# Patient Record
Sex: MALE | Race: WHITE | NOT HISPANIC OR LATINO | URBAN - METROPOLITAN AREA
[De-identification: names, ages, dates, MRNs, and addresses within clinical notes are randomized per-mention and may not be internally consistent; named-entity substitution may affect disease eponyms.]

---

## 2020-11-13 ENCOUNTER — WEB ENCOUNTER (OUTPATIENT)
Dept: URBAN - METROPOLITAN AREA CLINIC 70 | Facility: CLINIC | Age: 80
End: 2020-11-13

## 2020-11-13 ENCOUNTER — OFFICE VISIT (OUTPATIENT)
Dept: URBAN - METROPOLITAN AREA CLINIC 70 | Facility: CLINIC | Age: 80
End: 2020-11-13
Payer: COMMERCIAL

## 2020-11-13 DIAGNOSIS — R10.32 ABDOMINAL PAIN, LLQ: ICD-10-CM

## 2020-11-13 DIAGNOSIS — R53.82 CHRONIC FATIGUE: ICD-10-CM

## 2020-11-13 DIAGNOSIS — R68.81 EARLY SATIETY: ICD-10-CM

## 2020-11-13 DIAGNOSIS — K59.09 CHRONIC CONSTIPATION: ICD-10-CM

## 2020-11-13 PROCEDURE — 4004F PT TOBACCO SCREEN RCVD TLK: CPT | Performed by: INTERNAL MEDICINE

## 2020-11-13 PROCEDURE — G9902 PT SCRN TBCO AND ID AS USER: HCPCS | Performed by: INTERNAL MEDICINE

## 2020-11-13 PROCEDURE — G9906 PT RECV TBCO CESS INTERV: HCPCS | Performed by: INTERNAL MEDICINE

## 2020-11-13 PROCEDURE — G8420 CALC BMI NORM PARAMETERS: HCPCS | Performed by: INTERNAL MEDICINE

## 2020-11-13 PROCEDURE — G8427 DOCREV CUR MEDS BY ELIG CLIN: HCPCS | Performed by: INTERNAL MEDICINE

## 2020-11-13 PROCEDURE — G8483 FLU IMM NO ADMIN DOC REA: HCPCS | Performed by: INTERNAL MEDICINE

## 2020-11-13 PROCEDURE — 99214 OFFICE O/P EST MOD 30 MIN: CPT | Performed by: INTERNAL MEDICINE

## 2020-11-13 RX ORDER — OMEPRAZOLE 20 MG/1
TAKE 1 CAPSULE BY ORAL ROUTE 2 TIMES A DAY FOR 90 DAYS CAPSULE, DELAYED RELEASE ORAL 2
Qty: 180 | Refills: 1 | Status: ACTIVE | COMMUNITY
Start: 2017-09-13

## 2020-11-13 RX ORDER — GABAPENTIN 600 MG/1
TABLET, FILM COATED ORAL
Qty: 0 | Refills: 0 | Status: ACTIVE | COMMUNITY
Start: 1900-01-01

## 2020-11-13 RX ORDER — VERAPAMIL HYDROCHLORIDE 80 MG/1
1 PO QD TABLET ORAL
Qty: 0 | Refills: 0 | Status: ACTIVE | COMMUNITY
Start: 1900-01-01

## 2020-11-13 RX ORDER — ZOLPIDEM TARTRATE 10 MG/1
TABLET, FILM COATED ORAL
Qty: 0 | Refills: 0 | Status: ACTIVE | COMMUNITY
Start: 2017-08-21

## 2020-11-13 RX ORDER — AMITRIPTYLINE HYDROCHLORIDE 10 MG/1
1 TABLET AT BEDTIME TABLET, FILM COATED ORAL ONCE A DAY
Status: ACTIVE | COMMUNITY

## 2020-11-13 NOTE — HPI-TODAY'S VISIT:
Pt presents with abdominal pain. Pain has been present for left side abdominal pain.  Pain occurs for hours at a time. Pain is described as "soreness". Pain is aggrevated by nothing in particular. Pain is alleviated by nothing in particular. Pain occurs daily. Associated symptoms are constipation. Miralax has helped the constipation in the past but he has not been taking it recently. Prior workup CT scan 12/12/19 with no acute findings. He complains mostly of generalized weakness and fatigue. Feels hungry every couple of hours but becomes full after just a few bites. Reports a negative cardiac workup. Recent CBC and CMP done by his PCP were normal. He had a normal EGD in 2017. Last colonoscopy was >10 years ago.

## 2020-11-18 LAB
A/G RATIO: 1.8
ALBUMIN: 4.2
ALKALINE PHOSPHATASE: 122
ALT (SGPT): 10
AST (SGOT): 15
BILIRUBIN, TOTAL: 0.6
BUN/CREATININE RATIO: 14
BUN: 16
CALCIUM: 8.6
CARBON DIOXIDE, TOTAL: 24
CHLORIDE: 102
CREATININE: 1.18
EGFR IF AFRICN AM: 67
EGFR IF NONAFRICN AM: 58
FERRITIN, SERUM: 46
FOLATE (FOLIC ACID), SERUM: 4.5
GLOBULIN, TOTAL: 2.4
GLUCOSE: 65
HEMATOCRIT: 47.8
HEMOGLOBIN: 15.7
IRON BIND.CAP.(TIBC): 327
IRON SATURATION: 26
IRON: 86
MCH: 29.5
MCHC: 32.8
MCV: 90
NRBC: (no result)
PLATELETS: 152
POTASSIUM: 3.8
PROTEIN, TOTAL: 6.6
RBC: 5.32
RDW: 14.2
SODIUM: 141
UIBC: 241
VITAMIN B12: 250
WBC: 6.5

## 2020-12-08 ENCOUNTER — OFFICE VISIT (OUTPATIENT)
Dept: URBAN - METROPOLITAN AREA CLINIC 70 | Facility: CLINIC | Age: 80
End: 2020-12-08
Payer: COMMERCIAL

## 2020-12-08 ENCOUNTER — TELEPHONE ENCOUNTER (OUTPATIENT)
Dept: URBAN - METROPOLITAN AREA CLINIC 70 | Facility: CLINIC | Age: 80
End: 2020-12-08

## 2020-12-08 DIAGNOSIS — R53.82 CHRONIC FATIGUE: ICD-10-CM

## 2020-12-08 DIAGNOSIS — K59.09 CHRONIC CONSTIPATION: ICD-10-CM

## 2020-12-08 PROBLEM — 52702003 CHRONIC FATIGUE SYNDROME: Status: ACTIVE | Noted: 2020-11-13

## 2020-12-08 PROCEDURE — G8420 CALC BMI NORM PARAMETERS: HCPCS | Performed by: INTERNAL MEDICINE

## 2020-12-08 PROCEDURE — 99213 OFFICE O/P EST LOW 20 MIN: CPT | Performed by: INTERNAL MEDICINE

## 2020-12-08 PROCEDURE — G8427 DOCREV CUR MEDS BY ELIG CLIN: HCPCS | Performed by: INTERNAL MEDICINE

## 2020-12-08 PROCEDURE — 4004F PT TOBACCO SCREEN RCVD TLK: CPT | Performed by: INTERNAL MEDICINE

## 2020-12-08 PROCEDURE — G8483 FLU IMM NO ADMIN DOC REA: HCPCS | Performed by: INTERNAL MEDICINE

## 2020-12-08 RX ORDER — VERAPAMIL HYDROCHLORIDE 80 MG/1
1 PO QD TABLET ORAL
Qty: 0 | Refills: 0 | Status: ACTIVE | COMMUNITY
Start: 1900-01-01

## 2020-12-08 RX ORDER — AMITRIPTYLINE HYDROCHLORIDE 10 MG/1
1 TABLET AT BEDTIME TABLET, FILM COATED ORAL ONCE A DAY
Status: ACTIVE | COMMUNITY

## 2020-12-08 RX ORDER — OMEPRAZOLE 20 MG/1
TAKE 1 CAPSULE BY ORAL ROUTE 2 TIMES A DAY FOR 90 DAYS CAPSULE, DELAYED RELEASE ORAL 2
Qty: 180 | Refills: 1 | Status: ACTIVE | COMMUNITY
Start: 2017-09-13

## 2020-12-08 RX ORDER — GABAPENTIN 600 MG/1
TABLET, FILM COATED ORAL
Qty: 0 | Refills: 0 | Status: ACTIVE | COMMUNITY
Start: 1900-01-01

## 2020-12-08 RX ORDER — ZOLPIDEM TARTRATE 10 MG/1
TABLET, FILM COATED ORAL
Qty: 0 | Refills: 0 | Status: ACTIVE | COMMUNITY
Start: 2017-08-21

## 2020-12-08 NOTE — HPI-TODAY'S VISIT:
Note from OV on 11/13/20: Pt presents with left side abdominal pain.  Pain occurs for hours at a time. Pain is described as "soreness". Pain is aggrevated by nothing in particular. Pain is alleviated by nothing in particular. Pain occurs daily. Associated symptoms are constipation. Miralax has helped the constipation in the past but he has not been taking it recently. Prior workup CT scan 12/12/19 with no acute findings. He complains mostly of generalized weakness and fatigue. Feels hungry every couple of hours but becomes full after just a few bites. Reports a negative cardiac workup. Recent CBC and CMP done by his PCP were normal. He had a normal EGD in 2017. Last colonoscopy was >10 years ago. TODAY: He continues to c/o fatigue and constant hunger. Fells like "something is using up my calories". Weight is stable. Miralax caused severe diarrhea. He went to the ED on 11/28/20 for abdominal pain. CT scan showed mild to moderate stool but no other acute findings. CBC and CMP were normal.

## 2021-02-05 ENCOUNTER — OFFICE VISIT (OUTPATIENT)
Dept: URBAN - METROPOLITAN AREA CLINIC 70 | Facility: CLINIC | Age: 81
End: 2021-02-05
Payer: COMMERCIAL

## 2021-02-05 ENCOUNTER — DASHBOARD ENCOUNTERS (OUTPATIENT)
Age: 81
End: 2021-02-05

## 2021-02-05 VITALS
SYSTOLIC BLOOD PRESSURE: 137 MMHG | WEIGHT: 197.2 LBS | HEART RATE: 84 BPM | BODY MASS INDEX: 26.14 KG/M2 | HEIGHT: 73 IN | TEMPERATURE: 97 F | DIASTOLIC BLOOD PRESSURE: 80 MMHG

## 2021-02-05 DIAGNOSIS — R53.1 WEAKNESS AND FATIGUE: ICD-10-CM

## 2021-02-05 PROCEDURE — 99213 OFFICE O/P EST LOW 20 MIN: CPT | Performed by: INTERNAL MEDICINE

## 2021-02-05 PROCEDURE — 4004F PT TOBACCO SCREEN RCVD TLK: CPT | Performed by: INTERNAL MEDICINE

## 2021-02-05 PROCEDURE — G8482 FLU IMMUNIZE ORDER/ADMIN: HCPCS | Performed by: INTERNAL MEDICINE

## 2021-02-05 PROCEDURE — G8427 DOCREV CUR MEDS BY ELIG CLIN: HCPCS | Performed by: INTERNAL MEDICINE

## 2021-02-05 PROCEDURE — G8420 CALC BMI NORM PARAMETERS: HCPCS | Performed by: INTERNAL MEDICINE

## 2021-02-05 RX ORDER — OMEPRAZOLE 20 MG/1
TAKE 1 CAPSULE BY ORAL ROUTE 2 TIMES A DAY FOR 90 DAYS CAPSULE, DELAYED RELEASE ORAL 2
Qty: 180 | Refills: 1 | Status: ACTIVE | COMMUNITY
Start: 2017-09-13

## 2021-02-05 RX ORDER — VERAPAMIL HYDROCHLORIDE 80 MG/1
1 PO QD TABLET ORAL
Qty: 0 | Refills: 0 | Status: ACTIVE | COMMUNITY
Start: 1900-01-01

## 2021-02-05 RX ORDER — GABAPENTIN 600 MG/1
TABLET, FILM COATED ORAL
Qty: 0 | Refills: 0 | Status: ACTIVE | COMMUNITY
Start: 1900-01-01

## 2021-02-05 RX ORDER — AMITRIPTYLINE HYDROCHLORIDE 10 MG/1
1 TABLET AT BEDTIME TABLET, FILM COATED ORAL ONCE A DAY
Status: ACTIVE | COMMUNITY

## 2021-02-05 RX ORDER — ZOLPIDEM TARTRATE 10 MG/1
TABLET, FILM COATED ORAL
Qty: 0 | Refills: 0 | Status: ACTIVE | COMMUNITY
Start: 2017-08-21

## 2021-02-05 RX ORDER — ASPIRIN 81 MG/1
1 TABLET TABLET, COATED ORAL ONCE A DAY
Refills: 0 | Status: ACTIVE | COMMUNITY
Start: 1900-01-01

## 2021-02-05 NOTE — HPI-TODAY'S VISIT:
no abd pain  weight stable  co need t eat frequently or he gets weak   has had this for years   neg ct recently neg egd in recent past    had cardiac issue with egd and ercp in past and had held off on colonoscopy due to this hx    no lgi sxs   has copd and cont to smoke  sees dr maldonado regularly

## 2022-11-22 NOTE — PHYSICAL EXAM GASTROINTESTINAL
Abdomen , soft, nontender, nondistended , no guarding or rigidity , no masses palpable , normal bowel sounds , Liver and Spleen , no hepatomegaly present , no hepatosplenomegaly , liver nontender , spleen not palpable 22-Nov-2022 05:57